# Patient Record
Sex: FEMALE | Race: WHITE | NOT HISPANIC OR LATINO | Employment: UNEMPLOYED | URBAN - METROPOLITAN AREA
[De-identification: names, ages, dates, MRNs, and addresses within clinical notes are randomized per-mention and may not be internally consistent; named-entity substitution may affect disease eponyms.]

---

## 2018-11-07 ENCOUNTER — OFFICE VISIT CONVERTED (OUTPATIENT)
Dept: INTERNAL MEDICINE | Facility: CLINIC | Age: 38
End: 2018-11-07
Attending: NURSE PRACTITIONER

## 2019-01-07 ENCOUNTER — HOSPITAL ENCOUNTER (OUTPATIENT)
Dept: SURGERY | Facility: CLINIC | Age: 39
Discharge: HOME | End: 2019-01-07
Attending: UROLOGY

## 2019-01-07 ENCOUNTER — OFFICE VISIT CONVERTED (OUTPATIENT)
Dept: UROLOGY | Facility: CLINIC | Age: 39
End: 2019-01-07
Attending: UROLOGY

## 2019-01-10 LAB
AMOXICILLIN+CLAV SUSC ISLT: 16
AMPICILLIN SUSC ISLT: >=32
AMPICILLIN+SULBAC SUSC ISLT: >=32
BACTERIA UR CULT: ABNORMAL
CEFAZOLIN SUSC ISLT: <=4
CEFEPIME SUSC ISLT: <=1
CEFTAZIDIME SUSC ISLT: <=1
CEFTRIAXONE SUSC ISLT: <=1
CEFUROXIME ORAL SUSC ISLT: 4
CEFUROXIME PARENTER SUSC ISLT: 4
CIPROFLOXACIN SUSC ISLT: <=0.25
ERTAPENEM SUSC ISLT: <=0.5
GENTAMICIN SUSC ISLT: <=1
LEVOFLOXACIN SUSC ISLT: 0.5
NITROFURANTOIN SUSC ISLT: <=16
TETRACYCLINE SUSC ISLT: <=1
TMP SMX SUSC ISLT: <=20
TOBRAMYCIN SUSC ISLT: <=1

## 2019-01-25 ENCOUNTER — HOSPITAL ENCOUNTER (OUTPATIENT)
Dept: GENERAL RADIOLOGY | Facility: HOSPITAL | Age: 39
Discharge: HOME | End: 2019-01-25
Attending: UROLOGY

## 2019-01-28 ENCOUNTER — OFFICE VISIT CONVERTED (OUTPATIENT)
Dept: UROLOGY | Facility: CLINIC | Age: 39
End: 2019-01-28
Attending: UROLOGY

## 2019-01-28 ENCOUNTER — HOSPITAL ENCOUNTER (OUTPATIENT)
Dept: GENERAL RADIOLOGY | Facility: HOSPITAL | Age: 39
Discharge: HOME | End: 2019-01-28
Attending: UROLOGY

## 2019-02-06 ENCOUNTER — HOSPITAL ENCOUNTER (OUTPATIENT)
Dept: OTHER | Facility: HOSPITAL | Age: 39
Discharge: HOME | End: 2019-02-06
Attending: NURSE PRACTITIONER

## 2019-02-06 LAB
ALBUMIN SERPL-MCNC: 4.2 G/DL (ref 3.5–5)
ALBUMIN/GLOB SERPL: 1.3 {RATIO} (ref 1.4–2.6)
ALP SERPL-CCNC: 61 U/L (ref 42–98)
ALT SERPL-CCNC: 16 U/L (ref 10–40)
ANION GAP SERPL CALC-SCNC: 13 MMOL/L (ref 8–19)
AST SERPL-CCNC: 16 U/L (ref 15–50)
BILIRUB SERPL-MCNC: 0.86 MG/DL (ref 0.2–1.3)
BUN SERPL-MCNC: 8 MG/DL (ref 5–25)
BUN/CREAT SERPL: 10 {RATIO} (ref 6–20)
CALCIUM SERPL-MCNC: 9.1 MG/DL (ref 8.7–10.4)
CHLORIDE SERPL-SCNC: 103 MMOL/L (ref 99–111)
CONV CO2: 26 MMOL/L (ref 22–32)
CONV TOTAL PROTEIN: 7.4 G/DL (ref 6.3–8.2)
CREAT UR-MCNC: 0.81 MG/DL (ref 0.5–0.9)
GFR SERPLBLD BASED ON 1.73 SQ M-ARVRAT: >60 ML/MIN/{1.73_M2}
GLOBULIN UR ELPH-MCNC: 3.2 G/DL (ref 2–3.5)
GLUCOSE SERPL-MCNC: 90 MG/DL (ref 65–99)
OSMOLALITY SERPL CALC.SUM OF ELEC: 284 MOSM/KG (ref 273–304)
POTASSIUM SERPL-SCNC: 4.3 MMOL/L (ref 3.5–5.3)
SODIUM SERPL-SCNC: 138 MMOL/L (ref 135–147)

## 2019-02-08 ENCOUNTER — OFFICE VISIT CONVERTED (OUTPATIENT)
Dept: INTERNAL MEDICINE | Facility: CLINIC | Age: 39
End: 2019-02-08
Attending: NURSE PRACTITIONER

## 2019-04-01 ENCOUNTER — HOSPITAL ENCOUNTER (OUTPATIENT)
Dept: OTHER | Facility: HOSPITAL | Age: 39
Discharge: HOME | End: 2019-04-01
Attending: NURSE PRACTITIONER

## 2019-04-01 ENCOUNTER — OFFICE VISIT CONVERTED (OUTPATIENT)
Dept: INTERNAL MEDICINE | Facility: CLINIC | Age: 39
End: 2019-04-01
Attending: NURSE PRACTITIONER

## 2019-04-05 LAB
CONV LAST MENSTURAL PERIOD: NORMAL
PATHOLOGIST REVIEW: NORMAL
SPECIMEN SOURCE: NORMAL
SPECIMEN SOURCE: NORMAL
THIN PREP CVX: NORMAL

## 2019-05-31 ENCOUNTER — HOSPITAL ENCOUNTER (OUTPATIENT)
Dept: OTHER | Facility: HOSPITAL | Age: 39
Discharge: HOME | End: 2019-05-31
Attending: NURSE PRACTITIONER

## 2019-05-31 LAB
ALBUMIN SERPL-MCNC: 4 G/DL (ref 3.5–5)
ALBUMIN/GLOB SERPL: 1.3 {RATIO} (ref 1.4–2.6)
ALP SERPL-CCNC: 62 U/L (ref 42–98)
ALT SERPL-CCNC: 13 U/L (ref 10–40)
ANION GAP SERPL CALC-SCNC: 17 MMOL/L (ref 8–19)
AST SERPL-CCNC: 14 U/L (ref 15–50)
BILIRUB SERPL-MCNC: 0.53 MG/DL (ref 0.2–1.3)
BUN SERPL-MCNC: 9 MG/DL (ref 5–25)
BUN/CREAT SERPL: 11 {RATIO} (ref 6–20)
CALCIUM SERPL-MCNC: 9 MG/DL (ref 8.7–10.4)
CHLORIDE SERPL-SCNC: 105 MMOL/L (ref 99–111)
CONV CO2: 22 MMOL/L (ref 22–32)
CONV TOTAL PROTEIN: 7.1 G/DL (ref 6.3–8.2)
CREAT UR-MCNC: 0.8 MG/DL (ref 0.5–0.9)
GFR SERPLBLD BASED ON 1.73 SQ M-ARVRAT: >60 ML/MIN/{1.73_M2}
GLOBULIN UR ELPH-MCNC: 3.1 G/DL (ref 2–3.5)
GLUCOSE SERPL-MCNC: 100 MG/DL (ref 65–99)
OSMOLALITY SERPL CALC.SUM OF ELEC: 287 MOSM/KG (ref 273–304)
POTASSIUM SERPL-SCNC: 4.5 MMOL/L (ref 3.5–5.3)
SODIUM SERPL-SCNC: 139 MMOL/L (ref 135–147)

## 2020-03-31 ENCOUNTER — OFFICE VISIT CONVERTED (OUTPATIENT)
Dept: INTERNAL MEDICINE | Facility: CLINIC | Age: 40
End: 2020-03-31
Attending: INTERNAL MEDICINE

## 2020-03-31 ENCOUNTER — HOSPITAL ENCOUNTER (OUTPATIENT)
Dept: OTHER | Facility: HOSPITAL | Age: 40
Discharge: HOME | End: 2020-03-31
Attending: INTERNAL MEDICINE

## 2020-03-31 LAB
ALBUMIN SERPL-MCNC: 4.2 G/DL (ref 3.5–5)
ALBUMIN/GLOB SERPL: 1.4 {RATIO} (ref 1.4–2.6)
ALP SERPL-CCNC: 66 U/L (ref 42–98)
ALT SERPL-CCNC: 14 U/L (ref 10–40)
ANION GAP SERPL CALC-SCNC: 15 MMOL/L (ref 8–19)
AST SERPL-CCNC: 16 U/L (ref 15–50)
BASOPHILS # BLD AUTO: 0.05 10*3/UL (ref 0–0.2)
BASOPHILS NFR BLD AUTO: 0.8 % (ref 0–3)
BILIRUB SERPL-MCNC: 1.06 MG/DL (ref 0.2–1.3)
BUN SERPL-MCNC: 10 MG/DL (ref 5–25)
BUN/CREAT SERPL: 13 {RATIO} (ref 6–20)
CALCIUM SERPL-MCNC: 9 MG/DL (ref 8.7–10.4)
CHLORIDE SERPL-SCNC: 103 MMOL/L (ref 99–111)
CONV ABS IMM GRAN: 0.01 10*3/UL (ref 0–0.2)
CONV CO2: 24 MMOL/L (ref 22–32)
CONV IMMATURE GRAN: 0.2 % (ref 0–1.8)
CONV TOTAL PROTEIN: 7.2 G/DL (ref 6.3–8.2)
CREAT UR-MCNC: 0.8 MG/DL (ref 0.5–0.9)
DEPRECATED RDW RBC AUTO: 43.8 FL (ref 36.4–46.3)
EOSINOPHIL # BLD AUTO: 0.1 10*3/UL (ref 0–0.7)
EOSINOPHIL # BLD AUTO: 1.7 % (ref 0–7)
ERYTHROCYTE [DISTWIDTH] IN BLOOD BY AUTOMATED COUNT: 12.7 % (ref 11.7–14.4)
GFR SERPLBLD BASED ON 1.73 SQ M-ARVRAT: >60 ML/MIN/{1.73_M2}
GLOBULIN UR ELPH-MCNC: 3 G/DL (ref 2–3.5)
GLUCOSE SERPL-MCNC: 96 MG/DL (ref 65–99)
HCG SERPL-SCNC: NEGATIVE MMOL/L
HCT VFR BLD AUTO: 44 % (ref 37–47)
HGB BLD-MCNC: 14.3 G/DL (ref 12–16)
LYMPHOCYTES # BLD AUTO: 2.56 10*3/UL (ref 1–5)
LYMPHOCYTES NFR BLD AUTO: 42.7 % (ref 20–45)
MCH RBC QN AUTO: 30.4 PG (ref 27–31)
MCHC RBC AUTO-ENTMCNC: 32.5 G/DL (ref 33–37)
MCV RBC AUTO: 93.6 FL (ref 81–99)
MONOCYTES # BLD AUTO: 0.44 10*3/UL (ref 0.2–1.2)
MONOCYTES NFR BLD AUTO: 7.3 % (ref 3–10)
NEUTROPHILS # BLD AUTO: 2.83 10*3/UL (ref 2–8)
NEUTROPHILS NFR BLD AUTO: 47.3 % (ref 30–85)
NRBC CBCN: 0 % (ref 0–0.7)
OSMOLALITY SERPL CALC.SUM OF ELEC: 285 MOSM/KG (ref 273–304)
PLATELET # BLD AUTO: 181 10*3/UL (ref 130–400)
PMV BLD AUTO: 12.2 FL (ref 9.4–12.3)
POTASSIUM SERPL-SCNC: 4.4 MMOL/L (ref 3.5–5.3)
RBC # BLD AUTO: 4.7 10*6/UL (ref 4.2–5.4)
SODIUM SERPL-SCNC: 138 MMOL/L (ref 135–147)
WBC # BLD AUTO: 5.99 10*3/UL (ref 4.8–10.8)

## 2020-07-08 ENCOUNTER — OFFICE VISIT CONVERTED (OUTPATIENT)
Dept: INTERNAL MEDICINE | Facility: CLINIC | Age: 40
End: 2020-07-08
Attending: NURSE PRACTITIONER

## 2020-10-31 ENCOUNTER — HOSPITAL ENCOUNTER (OUTPATIENT)
Dept: OTHER | Facility: HOSPITAL | Age: 40
Discharge: HOME | End: 2020-10-31
Attending: OBSTETRICS & GYNECOLOGY

## 2020-10-31 LAB
ABO GROUP BLD: NORMAL
BLD GP AB SCN SERPL QL: NORMAL
BLOOD GROUP ANTIBODIES SERPL: NORMAL
CONV ABD CONTROL: NORMAL
RH BLD: NORMAL

## 2020-12-11 ENCOUNTER — HOSPITAL ENCOUNTER (OUTPATIENT)
Dept: LABOR AND DELIVERY | Facility: HOSPITAL | Age: 40
Discharge: HOME | End: 2020-12-11
Attending: OBSTETRICS & GYNECOLOGY

## 2020-12-18 ENCOUNTER — HOSPITAL ENCOUNTER (OUTPATIENT)
Dept: LABOR AND DELIVERY | Facility: HOSPITAL | Age: 40
Discharge: HOME | End: 2020-12-18
Attending: OBSTETRICS & GYNECOLOGY

## 2020-12-22 ENCOUNTER — HOSPITAL ENCOUNTER (OUTPATIENT)
Dept: LABOR AND DELIVERY | Facility: HOSPITAL | Age: 40
Discharge: HOME | End: 2020-12-22
Attending: OBSTETRICS & GYNECOLOGY

## 2020-12-24 ENCOUNTER — HOSPITAL ENCOUNTER (OUTPATIENT)
Dept: LABOR AND DELIVERY | Facility: HOSPITAL | Age: 40
Discharge: HOME | End: 2020-12-24
Attending: OBSTETRICS & GYNECOLOGY

## 2020-12-25 ENCOUNTER — HOSPITAL ENCOUNTER (OUTPATIENT)
Dept: LABOR AND DELIVERY | Facility: HOSPITAL | Age: 40
Discharge: HOME | End: 2020-12-25
Attending: OBSTETRICS & GYNECOLOGY

## 2020-12-25 LAB — CONV ALPHA-1-MICROGLOBULIN PLACENTAL (VAGINAL FLUID): NEGATIVE

## 2020-12-29 ENCOUNTER — HOSPITAL ENCOUNTER (OUTPATIENT)
Dept: LABOR AND DELIVERY | Facility: HOSPITAL | Age: 40
Discharge: HOME | End: 2020-12-29
Attending: OBSTETRICS & GYNECOLOGY

## 2021-01-04 ENCOUNTER — HOSPITAL ENCOUNTER (OUTPATIENT)
Dept: LABOR AND DELIVERY | Facility: HOSPITAL | Age: 41
Discharge: HOME | End: 2021-01-04
Attending: OBSTETRICS & GYNECOLOGY

## 2021-01-05 ENCOUNTER — HOSPITAL ENCOUNTER (OUTPATIENT)
Dept: LABOR AND DELIVERY | Facility: HOSPITAL | Age: 41
Discharge: HOME | End: 2021-01-05
Attending: OBSTETRICS & GYNECOLOGY

## 2021-01-08 ENCOUNTER — HOSPITAL ENCOUNTER (OUTPATIENT)
Dept: PREADMISSION TESTING | Facility: HOSPITAL | Age: 41
Discharge: HOME | End: 2021-01-08
Attending: OBSTETRICS & GYNECOLOGY

## 2021-01-09 LAB — SARS-COV-2 RNA SPEC QL NAA+PROBE: NOT DETECTED

## 2021-01-12 ENCOUNTER — HOSPITAL ENCOUNTER (OUTPATIENT)
Dept: LABOR AND DELIVERY | Facility: HOSPITAL | Age: 41
Discharge: HOME | End: 2021-01-12
Attending: OBSTETRICS & GYNECOLOGY

## 2021-01-22 ENCOUNTER — CONVERSION ENCOUNTER (OUTPATIENT)
Dept: INTERNAL MEDICINE | Facility: CLINIC | Age: 41
End: 2021-01-22

## 2021-01-22 ENCOUNTER — OFFICE VISIT CONVERTED (OUTPATIENT)
Dept: INTERNAL MEDICINE | Facility: CLINIC | Age: 41
End: 2021-01-22
Attending: PHYSICIAN ASSISTANT

## 2021-05-12 NOTE — PROGRESS NOTES
"   Progress Note      Patient Name: Rosemary Perez   Patient ID: [id number]   Sex: Female   YOB: 1980    Primary Care Provider: Maine JACINTO   Referring Provider: Maine JACINTO    Visit Date: March 31, 2020    Provider: Soumya Jarrett MD   Location: [facility]   Location Address: [address] [place] [address]  [id number]   Location Phone: [phone]          Chief Complaint  · \"I need to have a follow-up CBC from my ER visit on Sunday\"      History Of Present Illness  Rosemary Perez is a 39 year old /White female who presents for evaluation and treatment of:      acute pain    right sided lower abd pain over the weekend  went to ER  was found to have a ruptured ovarian cyst  no chest pain  no trouble breathing  pain is now resolved  she feels that this has happened before  CT showed renal issues as well, however she has had this since childhood  she hasn't seen urology recently, but would like to follow up with them    currently no fever  no abd pain  eating well  feels totally normal       Past Medical History  Disease Name Date Onset Notes   Anxiety --  --    Broken Bones --  1996   Depression --  2012   Diabetes in pregnancy --  --    Migraine headache --  --    Night sweats --  --    Recurrent UTI (urinary tract infection) --  --    Umbilical hernia --  --          Past Surgical History  Procedure Name Date Notes   Bilateral ureterectomy with reimplantation of ureters 1991 in [place]         Medication List  Name Date Started Instructions   cephalexin 500 mg oral capsule  take 1 capsule (500 mg) by oral route every 6 hours   ibuprofen 600 mg oral tablet  take 1 tablet (600 mg) by oral route every 6 hours as needed with food   Norco  mg oral tablet  take 1 tablet by oral route every 6 hours as needed for pain         Allergy List  Allergen Name Date Reaction Notes   NO KNOWN DRUG ALLERGIES --  --  --    Tape --  adhesive --  " "  Tegaderm --  --  --        Allergies Reconciled  Family Medical History  Disease Name Relative/Age Notes   Breast Neoplasm, Malignant Grandmother (maternal)/   --    Stroke  --    Heart Disease  --    Diabetes Mother/   --    FH: pancreatic cancer  --          Social History  Finding Status Start/Stop Quantity Notes   Alcohol --  --/-- --  occasionally   Tobacco Former --/-- 1 ppd 7 yrs          Immunizations  NameDate Admin Mfg Trade Name Lot Number Route Inj VIS Given VIS Publication   [id number]/16/2019 [facility] Fluzone Quadrivalent [id number] IM RD 12/16/2019    Comments: Pt tolerated well and left office in stable condition         Review of Systems  · Constitutional  o Denies  o : fever, fatigue, weight loss, weight gain  · Cardiovascular  o Denies  o : lower extremity edema, claudication, chest pressure, palpitations  · Respiratory  o Denies  o : shortness of breath, wheezing, cough, hemoptysis, dyspnea on exertion  · Gastrointestinal  o Denies  o : nausea, vomiting, diarrhea, constipation, abdominal pain      Vitals  Date Time BP Position Site L\R Cuff Size HR RR TEMP (F) WT  HT  BMI kg/m2 BSA m2 O2 Sat        02/08/2019 10:50 /76 Sitting    98 - R 16 98 152lbs 8oz 5'  8\" 23.19 1.82 97 %    04/01/2019 04:48 /78 Sitting    110 - R 16 98.1 158lbs 2oz 5'  8\" 24.04 1.86 98 %    03/31/2020 08:07 /74 Sitting    84 - R 16 98.5 163lbs 6oz 5'  8\" 24.84 1.89 98 %          Physical Examination  · Constitutional  o Appearance  o : no acute distress, well-nourished  · Head and Face  o Head  o :   § Inspection  § : atraumatic, normocephalic  · Eyes  o Eyes  o : extraocular movements intact, no scleral icterus, no conjunctival injection  · Respiratory  o Respiratory Effort  o : breathing comfortably, symmetric chest rise  o Auscultation of Lungs  o : clear to asculatation bilaterally, no wheezes, rales, or rhonchii  · Cardiovascular  o Heart  o :   § Auscultation of Heart  § : regular rate and rhythm, no " murmurs, rubs, or gallops  o Peripheral Vascular System  o :   § Extremities  § : no edema  · Gastrointestinal  o Abdominal Examination  o :   § Abdomen  § : bowel sounds present, non-distended, non-tender  · Neurologic  o Mental Status Examination  o :   § Orientation  § : grossly oriented to person, place and time  o Gait and Station  o :   § Gait Screening  § : normal gait  · Psychiatric  o General  o : normal mood and affect              Assessment  · Recurrent UTI     599.0/N39.0  will refer to urology as CT showed some scarring  · Right lower quadrant pain     789.03/R10.31  resolved    will check labs as this was suggested by the ER and radiology suggested checking BHCG  · Ovarian cyst     620.2/N83.209  encouraged her to follow up with OB/Gyn      Plan  · Orders  o CBC with Auto Diff [facility] ([id number]) - 599.0/N39.0 - 03/31/2020  o CMP [facility] ([id number]) - 599.0/N39.0, 789.03/R10.31, 620.2/N83.209 - 03/31/2020  o ACO-39: Current medications updated and reviewed () - - 03/31/2020  o B-HCG (Qualitative) ([id number]) - 599.0/N39.0, 789.03/R10.31, 620.2/N83.209 - 03/31/2020  · Medications  o cephalexin 500 mg oral capsule   SIG: take 1 capsule (500 mg) by oral route every 6 hours   DISP: (0) capsule with 0 refills  Discontinued on 03/31/2020     o Medications have been Reconciled  o Transition of Care or Provider Policy  · Instructions  o Patient was educated/instructed on their diagnosis, treatment and medications prior to discharge from the clinic today.  · Disposition  o As Needed Follow up            Electronically Signed by: Soumya Jarrett MD -Author on March 31, 2020 08:38:23 AM

## 2021-05-13 NOTE — PROGRESS NOTES
Progress Note      Patient Name: Rosemary Perez   Patient ID: [id number]   Sex: Female   YOB: 1980    Primary Care Provider: Maine JACINOT   Referring Provider: Maine JACINTO    Visit Date: July 8, 2020    Provider: OPHELIA Clarke   Location: [facility]   Location Address: [address] [place] [address], [address]  [place], [place]  [id number]   Location Phone: [phone]          Chief Complaint  · varicose veins, hurting      History Of Present Illness  Rosemary Perez is a 39 year old /White female who presents for evaluation and treatment of:      Varicose veins-  Started during pregnancy of middle child, have progressively worsened. Patient is currently 13 weeks pregnant. She has not been wearing compression stockings or elevating her feet. Reports history of superficial blood clot of the right thigh following the birth of her three year old. States since that time veins of bilateral thighs have progressively worsened, will get more painful and engorged the longer that she is on her feet. Denies erythema, edema, warmth.     Depression-  Patient reports she is doing well managing her depression without medication, however she does struggle with depressive episodes mostly centered around her teenage sons who are disrespectful. Patient reports she often finds herself in the shower crying, however knows she is more emotional now due to pregnancy. Denies SI/HI.       Past Medical History  Disease Name Date Onset Notes   Anxiety --  --    Broken Bones --  1996   Depression --  2012   Diabetes in pregnancy --  --    Migraine headache --  --    Night sweats --  --    Recurrent UTI (urinary tract infection) --  --    Umbilical hernia --  --          Past Surgical History  Procedure Name Date Notes   Bilateral ureterectomy with reimplantation of ureters 1991 in [place]         Allergy List  Allergen Name Date Reaction Notes   NO KNOWN DRUG ALLERGIES --  --  --    Tape  "--  adhesive --    Tegaderm --  --  --          Family Medical History  Disease Name Relative/Age Notes   Breast Neoplasm, Malignant Grandmother (maternal)/   --    Stroke  --    Heart Disease  --    Diabetes Mother/   --    FH: pancreatic cancer  --          Social History  Finding Status Start/Stop Quantity Notes   Alcohol --  --/-- --  occasionally   Tobacco Former --/-- 1 ppd 7 yrs          Immunizations  NameDate Admin Mfg Trade Name Lot Number Route Inj VIS Given VIS Publication   [id number]/16/2019 [facility] Fluzone Quadrivalent [id number] IM RD 12/16/2019    Comments: Pt tolerated well and left office in stable condition         Review of Systems  · Constitutional  o Denies  o : fever, fatigue, weight loss, weight gain  · Cardiovascular  o Admits  o : varicosities  o Denies  o : chest pain, palpitations, lower extremity edema  · Respiratory  o Denies  o : shortness of breath, wheezing, frequent cough, dyspnea on exertion  · Gastrointestinal  o Denies  o : nausea, vomiting, diarrhea, constipation, abdominal pain  · Neurologic  o Denies  o : altered mental status, muscular weakness, tingling or numbness  · Psychiatric  o Admits  o : depression  o Denies  o : anxiety, suicidal ideation, homicidal ideation      Vitals  Date Time BP Position Site L\R Cuff Size HR RR TEMP (F) WT  HT  BMI kg/m2 BSA m2 O2 Sat        04/01/2019 04:48 /78 Sitting    110 - R 16 98.1 158lbs 2oz 5'  8\" 24.04 1.86 98 %    03/31/2020 08:07 /74 Sitting    84 - R 16 98.5 163lbs 6oz 5'  8\" 24.84 1.89 98 %    07/08/2020 03:54 /68 Sitting    97 - R 16 98.7 164lbs 4oz 5'  8\" 24.97 1.89 98 %          Physical Examination  · Constitutional  o Appearance  o : no acute distress, well-nourished  · Head and Face  o Head  o :   § Inspection  § : atraumatic, normocephalic  · Eyes  o Eyes  o : extraocular movements intact, no scleral icterus, no conjunctival injection  · Ears, Nose, Mouth and Throat  o Ears  o :   § External Ears  § : " normal  o Nose  o :   § Intranasal Exam  § : nares patent  o Oral Cavity  o :   § Oral Mucosa  § : moist mucous membranes  · Respiratory  o Respiratory Effort  o : breathing comfortably, symmetric chest rise  o Auscultation of Lungs  o : clear to asculatation bilaterally, no wheezes, rales, or rhonchii  · Cardiovascular  o Heart  o :   § Auscultation of Heart  § : regular rate and rhythm, no murmurs, rubs, or gallops  o Peripheral Vascular System  o :   § Extremities  § : no edema  · Skin and Subcutaneous Tissue  o General Inspection  o : scattered varicose veins thighs/calves bilateral lower extremities; without warmth, erythema, edema, streaking, tenderness  · Neurologic  o Mental Status Examination  o :   § Orientation  § : grossly oriented to person, place and time  o Gait and Station  o :   § Gait Screening  § : normal gait  · Psychiatric  o General  o : normal mood and affect              Assessment  · Screening for depression     V79.0/Z13.89  PHQ9 score of 10.  · Depression     311/F32.9  Well controlled without medication per patient. She will continue to moitnor and seek medical attention immediately if she feels that her mental health is deteriorating. Denies SI/HI. Will continue to monitor.  · Varicose veins of both lower extremities     454.9/I83.93  Discussed conservative care, including thigh high compression stockings, elevation of the feet, stretches, ambulation. Patient would prefer to trial conservative measures prior to scheduling Doppler. Will call or return to clinic if symptoms worsen or persist, could consider bilateral lower extremity Doppler and vascular referral if warranted. Patient to seek medical attention immediately with calf pain, warmth, erythema, edema. Patient voices understanding and is agreeable to plan.     Problems Reconciled  Plan  · Orders  o ACO-39: Current medications updated and reviewed () - - 07/08/2020  o ACO-18: Positive screen for clinical depression using a  standardized tool and a follow-up plan documented () - V79.0/Z13.89 - 07/08/2020  · Medications  o Medications have been Reconciled  o Transition of Care or Provider Policy  · Instructions  o Depression Screen completed and scanned into the EMR under the designated folder within the patient's documents.  o Today's PHQ-9 result is 10  o Patient was educated/instructed on their diagnosis, treatment and medications prior to discharge from the clinic today.  o Patient instructed to seek medical attention urgently for new or worsening symptoms.  o Call the office with any concerns or questions.  · Disposition  o Call or Return if symptoms worsen or persist.  o Follow up as needed            Electronically Signed by: OPHELIA Clarke -Author on July 10, 2020 07:33:03 AM

## 2021-05-14 VITALS
DIASTOLIC BLOOD PRESSURE: 80 MMHG | TEMPERATURE: 98.2 F | BODY MASS INDEX: 25.91 KG/M2 | OXYGEN SATURATION: 100 % | HEART RATE: 84 BPM | RESPIRATION RATE: 15 BRPM | SYSTOLIC BLOOD PRESSURE: 122 MMHG | WEIGHT: 171 LBS | HEIGHT: 68 IN

## 2021-05-14 NOTE — PROGRESS NOTES
Progress Note      Patient Name: Rosemary Perez   Patient ID: [id number]   Sex: Female   YOB: 1980    Primary Care Provider: Maine JACINTO   Referring Provider: Maine JACINTO    Visit Date: January 22, 2021    Provider: Adwoa Hernandez PA-C   Location: [facility]   Location Address: [address] [place] [address]  [place], [place]  [id number]   Location Phone: [phone]          Chief Complaint  · R hand pain       History Of Present Illness  Rosemary Perez is a 40 year old /White female who presents for evaluation and treatment of:      R hand pain for over a month. First started after injuring her hand bowling.  Pt is in a bowling league. She has bowled since she was 7 yrs old  When she reaches to grab something that is when she feels it the most, she has pain that radiates up around elbow and into her back behind her shoulder blade.   Pain is constant in fingers but worse if she is reaching.   She has numbness and tingling in all fingers except the pinky.   She states when she makes a fist she feels pain along her knucles.   She feels fingers are swollen.   Denies discoloration or blueing of fingers.  She had a baby 1 wk ago. She had swelling with pregnancy.   Denies neck pain. She is able to move shoulder, elbow, and wrist, fingers normally.   Denies fever.   No issues with L hand.  She never had issues with pain before last month.       Past Medical History  Disease Name Date Onset Notes   Anxiety --  --    Broken Bones --  1996   Depression --  2012   Diabetes in pregnancy --  --    Migraine headache --  --    Night sweats --  --    Recurrent UTI (urinary tract infection) --  --    Umbilical hernia --  --          Past Surgical History  Procedure Name Date Notes   Bilateral ureterectomy with reimplantation of ureters 1991 in [place]         Allergy List  Allergen Name Date Reaction Notes   NO KNOWN DRUG ALLERGIES --  --  --    Tape --  adhesive --   "  Tegaderm --  --  --        Allergies Reconciled  Family Medical History  Disease Name Relative/Age Notes   Breast Neoplasm, Malignant Grandmother (maternal)/   --    Stroke  --    Heart Disease  --    Diabetes Mother/   --    FH: pancreatic cancer  --          Social History  Finding Status Start/Stop Quantity Notes   Alcohol --  --/-- --  occasionally   Tobacco Former --/-- 1 ppd 7 yrs          Immunizations  NameDate Admin Mfg Trade Name Lot Number Route Inj VIS Given VIS Publication   [id number]/27/2020 [facility] Fluzone Quadrivalent [id number] IM LA 10/27/2020 08/15/2019   Comments: Tolerated well, no adverse reactions noted, pt left office stable.   Tdap10/27/2020 [facility] BOOSTRIX [id number] IM RA 10/27/2020    Comments: tolerated well, no advers reactions noted, pt left office stable         Vitals  Date Time BP Position Site L\R Cuff Size HR RR TEMP (F) WT  HT  BMI kg/m2 BSA m2 O2 Sat FR L/min FiO2 HC       03/31/2020 08:07 /74 Sitting    84 - R 16 98.5 163lbs 6oz 5'  8\" 24.84 1.89 98 %  21%    07/08/2020 03:54 /68 Sitting    97 - R 16 98.7 164lbs 4oz 5'  8\" 24.97 1.89 98 %  21%    01/22/2021 03:01 /80 Sitting    84 - R 15 98.2 171lbs 0oz 5'  8\" 26 1.93 100 %            Physical Examination  · Constitutional  o Appearance  o : no acute distress, well-nourished  · Head and Face  o Head  o :   § Inspection  § : atraumatic, normocephalic  · Eyes  o Eyes  o : extraocular movements intact, no scleral icterus, no conjunctival injection  · Ears, Nose, Mouth and Throat  o Ears  o :   § External Ears  § : normal  o Nose  o :   § Intranasal Exam  § : nares patent  o Oral Cavity  o :   § Oral Mucosa  § : moist mucous membranes  · Respiratory  o Respiratory Effort  o : breathing comfortably, symmetric chest rise  o Auscultation of Lungs  o : clear to asculatation bilaterally, no wheezes, rales, or rhonchii  · Cardiovascular  o Heart  o :   § Auscultation of Heart  § : regular rate and rhythm, no murmurs, rubs, or " gallops  o Peripheral Vascular System  o :   § Extremities  § : no edema  · Skin and Subcutaneous Tissue  o General Inspection  o : no lesions present, no areas of discoloration, skin turgor normal  · Neurologic  o Mental Status Examination  o :   § Orientation  § : grossly oriented to person, place and time  o Gait and Station  o :   § Gait Screening  § : normal gait  · Psychiatric  o General  o : normal mood and affect     MSK: NROM R shoulder, elbow wrist, fingers  minimal edema noted in 2 and 3 fingers on R hand.  NROM neck.               Assessment  · Right hand pain     729.5/M79.641  discussed differential diagnosis of right hand pain. Discussed possibly related to swelling from pregnancy. Will monitor for a few weeks since patient is only 1 week postpartum to see if it improves. Encouraged ibuprofen over-the-counter to help with inflammation at this point. If no improvement will refer to Ortho to discuss imaging in case she had an injury bowling when symptoms were started.  · Neuropathy     355.9/G62.9      Plan  · Orders  o ACO-39: Current medications updated and reviewed (, 1159F) - - 01/22/2021  · Medications  o ibuprofen 200 mg oral capsule   SIG: take 2 capsules (400 mg) by oral route every 6 hours as needed with food for 15 days   DISP: (90) Capsule with 0 refills  Prescribed on 01/22/2021     o Medications have been Reconciled  o Transition of Care or Provider Policy  · Instructions  o Patient was educated/instructed on their diagnosis, treatment and medications prior to discharge from the clinic today.  · Disposition  o Call or Return if symptoms worsen or persist.  o Follow up in 2 weeks            Electronically Signed by: Adwoa Hernandez PA-C -Author on January 22, 2021 08:49:45 PM

## 2021-05-15 VITALS
SYSTOLIC BLOOD PRESSURE: 122 MMHG | HEIGHT: 68 IN | WEIGHT: 149.25 LBS | BODY MASS INDEX: 22.62 KG/M2 | DIASTOLIC BLOOD PRESSURE: 78 MMHG

## 2021-05-15 VITALS
HEIGHT: 68 IN | OXYGEN SATURATION: 98 % | DIASTOLIC BLOOD PRESSURE: 74 MMHG | RESPIRATION RATE: 16 BRPM | SYSTOLIC BLOOD PRESSURE: 116 MMHG | TEMPERATURE: 98.5 F | BODY MASS INDEX: 24.76 KG/M2 | WEIGHT: 163.37 LBS | HEART RATE: 84 BPM

## 2021-05-15 VITALS
WEIGHT: 158.12 LBS | HEART RATE: 110 BPM | RESPIRATION RATE: 16 BRPM | OXYGEN SATURATION: 98 % | HEIGHT: 68 IN | BODY MASS INDEX: 23.96 KG/M2 | DIASTOLIC BLOOD PRESSURE: 78 MMHG | TEMPERATURE: 98.1 F | SYSTOLIC BLOOD PRESSURE: 118 MMHG

## 2021-05-15 VITALS
WEIGHT: 152.5 LBS | HEART RATE: 98 BPM | TEMPERATURE: 98 F | RESPIRATION RATE: 16 BRPM | DIASTOLIC BLOOD PRESSURE: 76 MMHG | SYSTOLIC BLOOD PRESSURE: 110 MMHG | BODY MASS INDEX: 23.11 KG/M2 | HEIGHT: 68 IN | OXYGEN SATURATION: 97 %

## 2021-05-15 VITALS
BODY MASS INDEX: 24.89 KG/M2 | HEART RATE: 97 BPM | SYSTOLIC BLOOD PRESSURE: 110 MMHG | RESPIRATION RATE: 16 BRPM | OXYGEN SATURATION: 98 % | HEIGHT: 68 IN | DIASTOLIC BLOOD PRESSURE: 68 MMHG | TEMPERATURE: 98.7 F | WEIGHT: 164.25 LBS

## 2021-05-15 VITALS
DIASTOLIC BLOOD PRESSURE: 80 MMHG | SYSTOLIC BLOOD PRESSURE: 126 MMHG | HEIGHT: 68 IN | WEIGHT: 154 LBS | BODY MASS INDEX: 23.34 KG/M2

## 2021-05-16 VITALS
HEART RATE: 102 BPM | WEIGHT: 154.25 LBS | TEMPERATURE: 97.2 F | DIASTOLIC BLOOD PRESSURE: 72 MMHG | HEIGHT: 68 IN | BODY MASS INDEX: 23.38 KG/M2 | SYSTOLIC BLOOD PRESSURE: 122 MMHG | OXYGEN SATURATION: 97 %

## 2022-01-06 ENCOUNTER — OFFICE VISIT (OUTPATIENT)
Dept: INTERNAL MEDICINE | Facility: CLINIC | Age: 42
End: 2022-01-06

## 2022-01-06 VITALS
HEART RATE: 92 BPM | WEIGHT: 158.1 LBS | OXYGEN SATURATION: 99 % | TEMPERATURE: 98.2 F | DIASTOLIC BLOOD PRESSURE: 68 MMHG | BODY MASS INDEX: 23.96 KG/M2 | RESPIRATION RATE: 18 BRPM | HEIGHT: 68 IN | SYSTOLIC BLOOD PRESSURE: 121 MMHG

## 2022-01-06 DIAGNOSIS — R05.9 COUGH: ICD-10-CM

## 2022-01-06 DIAGNOSIS — U07.1 COVID-19: Primary | ICD-10-CM

## 2022-01-06 LAB
EXPIRATION DATE: ABNORMAL
EXPIRATION DATE: NORMAL
FLUAV AG NPH QL: NEGATIVE
FLUBV AG NPH QL: NEGATIVE
INTERNAL CONTROL: ABNORMAL
INTERNAL CONTROL: NORMAL
Lab: ABNORMAL
Lab: NORMAL
SARS-COV-2 AG UPPER RESP QL IA.RAPID: DETECTED

## 2022-01-06 PROCEDURE — 87426 SARSCOV CORONAVIRUS AG IA: CPT | Performed by: NURSE PRACTITIONER

## 2022-01-06 PROCEDURE — 99213 OFFICE O/P EST LOW 20 MIN: CPT | Performed by: NURSE PRACTITIONER

## 2022-01-06 PROCEDURE — 87804 INFLUENZA ASSAY W/OPTIC: CPT | Performed by: NURSE PRACTITIONER

## 2022-01-06 NOTE — ASSESSMENT & PLAN NOTE
Patient with positive COVID-19 rapid antigen test.  This does correlate with symptomology and COVID exposure.  She will continue to quarantine for a total of 10 days from onset of symptoms.  She does not wish to have any medications at this time, she has feeling like she is able to manage them on her own at this time.  Did give recommendations.  Advised to follow-up if symptoms worsen or persist.

## 2022-01-06 NOTE — PROGRESS NOTES
"Chief Complaint  Cough (2-3 days), Sore Throat, Fever, Chills, and Generalized Body Aches    Subjective         Rosemary Perez presents to [facility] for Cough, sore throat, fever, chills, and body aches.    Patient reports that on Tuesday she began having symptoms, her  did go to urgent care and tested positive for COVID-19.  Other members in the household also became ill at the same time.  Patient has not had any testing done at this point.  She reports her symptoms are mild and she has been able to manage them thus far.  She is mainly wanted to be tested for confirmation.         Review of Systems   Constitutional: Positive for chills and fatigue. Negative for fever.   HENT: Positive for sore throat. Negative for congestion.    Respiratory: Positive for cough. Negative for shortness of breath.    Gastrointestinal: Negative for diarrhea, nausea and vomiting.   Musculoskeletal: Positive for myalgias.   Skin: Negative for rash.   Neurological: Positive for headaches.       Objective   Vital Signs:   /68 (BP Location: Left arm, Patient Position: Sitting, Cuff Size: Adult)   Pulse 92   Temp 98.2 °F (36.8 °C)   Resp 18   Ht 172.7 cm (68\")   Wt 71.7 kg (158 lb 1.6 oz)   SpO2 99%   BMI 24.04 kg/m²     Physical Exam  Vitals and nursing note reviewed.   Constitutional:       Appearance: Normal appearance. She is normal weight.   HENT:      Head: Normocephalic and atraumatic.      Right Ear: Tympanic membrane normal.      Left Ear: Tympanic membrane normal.      Nose: Nose normal.      Mouth/Throat:      Mouth: Mucous membranes are moist.      Pharynx: Oropharynx is clear.   Eyes:      Conjunctiva/sclera: Conjunctivae normal.      Pupils: Pupils are equal, round, and reactive to light.   Cardiovascular:      Rate and Rhythm: Normal rate.   Pulmonary:      Effort: Pulmonary effort is normal.   Neurological:      Mental Status: She is alert.   Psychiatric:         Mood and " Affect: Mood normal.         Thought Content: Thought content normal.        Result Review :                   Diagnoses and all orders for this visit:    1. COVID-19 (Primary)  Assessment & Plan:  Patient with positive COVID-19 rapid antigen test.  This does correlate with symptomology and COVID exposure.  She will continue to quarantine for a total of 10 days from onset of symptoms.  She does not wish to have any medications at this time, she has feeling like she is able to manage them on her own at this time.  Did give recommendations.  Advised to follow-up if symptoms worsen or persist.      2. Cough  -     POCT Influenza A/B  -     POCT SARS-CoV-2 Antigen ALLISON        Follow Up   Return if symptoms worsen or fail to improve.  Patient was given instructions and counseling regarding her condition or for health maintenance advice. Please see specific information pulled into the AVS if appropriate.     John Vicente, OPHELIA  1/6/2022  This note was electronically signed.

## 2022-01-24 NOTE — TELEPHONE ENCOUNTER
Patient called this am.  She states no longer breast feeding. She requested OTC Lo.   I have attached an order. Last seen wwe 6-25-21.

## 2022-01-25 RX ORDER — NORGESTIMATE AND ETHINYL ESTRADIOL 7DAYSX3 LO
1 KIT ORAL DAILY
Qty: 84 TABLET | Refills: 0 | Status: SHIPPED | OUTPATIENT
Start: 2022-01-25 | End: 2022-07-18

## 2022-06-23 ENCOUNTER — OFFICE VISIT (OUTPATIENT)
Dept: OBSTETRICS AND GYNECOLOGY | Facility: CLINIC | Age: 42
End: 2022-06-23

## 2022-06-23 DIAGNOSIS — Z01.419 ENCOUNTER FOR GYNECOLOGICAL EXAMINATION WITHOUT ABNORMAL FINDING: Primary | ICD-10-CM

## 2022-06-23 PROCEDURE — 99395 PREV VISIT EST AGE 18-39: CPT | Performed by: OBSTETRICS & GYNECOLOGY

## 2022-06-23 PROCEDURE — G0123 SCREEN CERV/VAG THIN LAYER: HCPCS | Performed by: OBSTETRICS & GYNECOLOGY

## 2022-06-23 NOTE — PROGRESS NOTES
Well Woman Visit    CC: Annual well woman exam       HPI:   41 y.o.No obstetric history on file. Contraception or HRT: Contraception:  Vasectomy   Menses:  Have been irreg since stopping breastfeeding and getting covid in January   Pain:  None  Incontinence concerns: No  Hx of abnormal pap:  Yes  Pt has no complaints today.      History: PMHx, Meds, Allergies, PSHx, Social Hx, and POBHx all reviewed and updated.      PHYSICAL EXAM:  There were no vitals taken for this visit.  General- NAD, alert and oriented, appropriate  Psych- Normal mood, good memory  Neck- No masses, no thyroid enlargement  CV- Regular rhythm, no murnurs  Resp- CTA to bases, no wheezes  Abdomen- Soft, non distended, non tender, no masses    Breast left-  Bilaterally symmetrical, no masses, non tender, no nipple discharge  Breast right- Bilaterally symmetrical, no masses, non tender, no nipple discharge    External genitalia- Normal female, no lesions  Urethra/meatus- Normal, no masses, non tender, no prolapse  Bladder- Normal, no masses, non tender, no prolapse  Vagina- Normal, no atrophy, no lesions, no discharge, no prolapse  Cvx- Normal, no lesions, no discharge, No cervical motion tenderness  Uterus- Normal size, shape & consistency.  Non tender, mobile, & no prolapse  Adnexa- No mass, non tender  Anus/Rectum/Perineum- Not performed    Lymphatic- No palpable neck, axillary, or groin nodes  Ext- No edema, no cyanosis    Skin- No lesions, no rashes, no acanthosis nigricans        ASSESSMENT and PLAN:  WWE    Diagnoses and all orders for this visit:    1. Encounter for gynecological examination without abnormal finding (Primary)  -     IGP,rfx Aptima HPV All Pth        Counseling:     Track menses, RTO IF <q21d, >7d long, or heavy    Domestic violence/abuse screen: negative    Depression screen: no SI    Preventative:   BREAST HEALTH- Monthly self breast exam importance and how to reviewed. MMG and/or MRI (prn) reviewed per society guidelines  and her individual history. Mammo/MRI screen: Not medically needed.  CERVICAL CANCER Screening- Reviewed current ASCCP guidelines for screening w and wo cotest HPV, age specific.  Screen: Updated today.  COLON CANCER Screening- Reviewed current medical society guidelines and options.  Colonoscopy screen:  Not medically needed.  SEXUAL HEALTH: Declines STD screening.  VACCINATIONS Recommended: Flu annually.  Importance discussed, risk being unvaccinated reviewed.  Questions answered  Smoking status- NON SMOKER.  Importance of avoiding second hand smoke.  Myriad: has already had testing      She understands the importance of having any ordered tests to be performed in a timely fashion.  She is encouraged to review her results online and/or contact or office if she has questions.     Follow Up:  Return if symptoms worsen or fail to improve.      Lary Marte, APRN  06/23/2022

## 2022-06-27 LAB
CONV .: NORMAL
CYTOLOGIST CVX/VAG CYTO: NORMAL
CYTOLOGY CVX/VAG DOC CYTO: NORMAL
CYTOLOGY CVX/VAG DOC THIN PREP: NORMAL
DX ICD CODE: NORMAL
HIV 1 & 2 AB SER-IMP: NORMAL
OTHER STN SPEC: NORMAL
STAT OF ADQ CVX/VAG CYTO-IMP: NORMAL

## 2022-07-18 ENCOUNTER — OFFICE VISIT (OUTPATIENT)
Dept: INTERNAL MEDICINE | Facility: CLINIC | Age: 42
End: 2022-07-18

## 2022-07-18 VITALS
HEIGHT: 68 IN | DIASTOLIC BLOOD PRESSURE: 62 MMHG | RESPIRATION RATE: 15 BRPM | TEMPERATURE: 99.6 F | SYSTOLIC BLOOD PRESSURE: 128 MMHG | BODY MASS INDEX: 24.1 KG/M2 | WEIGHT: 159 LBS | OXYGEN SATURATION: 96 % | HEART RATE: 103 BPM

## 2022-07-18 DIAGNOSIS — U07.1 COVID-19 VIRUS DETECTED: Primary | ICD-10-CM

## 2022-07-18 DIAGNOSIS — R50.9 FEVER, UNSPECIFIED FEVER CAUSE: ICD-10-CM

## 2022-07-18 PROCEDURE — 99213 OFFICE O/P EST LOW 20 MIN: CPT | Performed by: PHYSICIAN ASSISTANT

## 2022-07-18 PROCEDURE — 87804 INFLUENZA ASSAY W/OPTIC: CPT | Performed by: PHYSICIAN ASSISTANT

## 2022-07-18 PROCEDURE — 87426 SARSCOV CORONAVIRUS AG IA: CPT | Performed by: PHYSICIAN ASSISTANT

## 2022-07-18 NOTE — PROGRESS NOTES
"Chief Complaint  Nasal Congestion and Fever    Subjective          LisaLucia Perez presents to [facility]  Pt here for eval of cough, nasal congestion, and fever.   Sx started 7/17  Fever max 102, improved with tylenol.  Denies sob, chest pain, dizziness  She has been around a child who tested positive for covid.   Pt is not vaccinated but has had covid before.      History reviewed. No pertinent past medical history.     History reviewed. No pertinent surgical history.     Current Outpatient Medications on File Prior to Visit   Medication Sig Dispense Refill   • [DISCONTINUED] norgestimate-ethinyl estradiol (Ortho Tri-Cyclen Lo) 0.18/0.215/0.25 MG-25 MCG per tablet Take 1 tablet by mouth Daily. 84 tablet 0   • [DISCONTINUED] Unable to find 1 each 1 (One) Time. Med Name: Mini Pill Birth Control Pill       No current facility-administered medications on file prior to visit.        Allergies   Allergen Reactions   • Tape Unknown - High Severity   • Tegaderm Chg Dressing [Chlorhexidine] Unknown - High Severity       Social History     Tobacco Use   Smoking Status Never Smoker   Smokeless Tobacco Never Used        Objective   Vital Signs:   /62   Pulse 103   Temp 99.6 °F (37.6 °C)   Resp 15   Ht 172.7 cm (68\")   Wt 72.1 kg (159 lb)   SpO2 96%   BMI 24.18 kg/m²     Physical Exam  Vitals reviewed.   Constitutional:       Appearance: Normal appearance.   HENT:      Head: Normocephalic and atraumatic.      Nose: Nose normal.      Mouth/Throat:      Mouth: Mucous membranes are moist.   Eyes:      Extraocular Movements: Extraocular movements intact.      Conjunctiva/sclera: Conjunctivae normal.      Pupils: Pupils are equal, round, and reactive to light.   Cardiovascular:      Rate and Rhythm: Normal rate and regular rhythm.   Pulmonary:      Effort: Pulmonary effort is normal.      Breath sounds: Normal breath sounds.   Abdominal:      General: Abdomen is " flat. Bowel sounds are normal.      Palpations: Abdomen is soft.   Musculoskeletal:         General: Normal range of motion.   Neurological:      General: No focal deficit present.      Mental Status: She is alert and oriented to person, place, and time.   Psychiatric:         Mood and Affect: Mood normal.        Result Review :                  Lab Results   Component Value Date    SARSANTIGEN Detected (A) 07/18/2022    COVID19 NOT DETECTED 01/08/2021    RAPFLUA Negative 07/18/2022    RAPFLUB Negative 07/18/2022    INR 0.98 (L) 02/08/2020    HGB 14.0 01/13/2021       Assessment and Plan    Diagnoses and all orders for this visit:    1. COVID-19 virus detected (Primary)  Assessment & Plan:  COVID positive. The health department will be in contact with you but we recommend 10 day quarantine from the day of first symptoms since pt is not vaccinated. Isolation can end 24 hr after fever resolved if symptoms are improving. Discussed patient needs to continue precautions up to 10 days after symptom onset- no travel, avoiding others who are likely to become ill from covid, and wearing a mask at all times. You need to let anyone you have been around know so that they can start quarantine or be evaluated if needed. The reasons to seek care again- symptoms worsening, respiratory distress, fever not controlled with Tylenol/Motrin, dehydration causing decreased fluid intake or urine output. There is no treatment for COVID, we can only treat the symptoms so please stay home unless seeking medical care to prevent the spread. Call the office if you have any other concerns or feel you need to be evaluated again.        2. Fever, unspecified fever cause  -     POCT LILY SARS-CoV-2 Antigen ALLISON  -     POC Influenza A / B      Follow Up {Instructions Charge Capture  Follow-up Communications :23}  Return if symptoms worsen or fail to improve.  Patient was given instructions and counseling regarding her condition or for health  maintenance advice. Please see specific information pulled into the AVS if appropriate.

## 2022-07-18 NOTE — ASSESSMENT & PLAN NOTE
COVID positive. The health department will be in contact with you but we recommend 10 day quarantine from the day of first symptoms since pt is not vaccinated. Isolation can end 24 hr after fever resolved if symptoms are improving. Discussed patient needs to continue precautions up to 10 days after symptom onset- no travel, avoiding others who are likely to become ill from covid, and wearing a mask at all times. You need to let anyone you have been around know so that they can start quarantine or be evaluated if needed. The reasons to seek care again- symptoms worsening, respiratory distress, fever not controlled with Tylenol/Motrin, dehydration causing decreased fluid intake or urine output. There is no treatment for COVID, we can only treat the symptoms so please stay home unless seeking medical care to prevent the spread. Call the office if you have any other concerns or feel you need to be evaluated again.

## 2022-12-28 ENCOUNTER — TELEPHONE (OUTPATIENT)
Dept: INTERNAL MEDICINE | Facility: CLINIC | Age: 42
End: 2022-12-28

## 2022-12-28 NOTE — TELEPHONE ENCOUNTER
Caller: Rosemary Perez    Relationship: Self    Best call back number: [phone]    What orders are you requesting (i.e. lab or imaging): LABS    In what timeframe would the patient need to come in: AS SOON AS POSSIBLE    Where will you receive your lab/imaging services: [facility]    Additional notes: PATIENT CALLED AND STATED THAT SHE WOULD LIKE TO HAVE LABS DONE TO SEE IF SHE IS PREMENOPAUSAL.

## 2023-01-04 ENCOUNTER — OFFICE VISIT (OUTPATIENT)
Dept: INTERNAL MEDICINE | Facility: CLINIC | Age: 43
End: 2023-01-04
Payer: MEDICAID

## 2023-01-04 VITALS
TEMPERATURE: 98.2 F | OXYGEN SATURATION: 97 % | SYSTOLIC BLOOD PRESSURE: 128 MMHG | WEIGHT: 168 LBS | DIASTOLIC BLOOD PRESSURE: 76 MMHG | HEART RATE: 98 BPM | BODY MASS INDEX: 25.54 KG/M2

## 2023-01-04 DIAGNOSIS — N95.1 PERIMENOPAUSAL: Primary | ICD-10-CM

## 2023-01-04 DIAGNOSIS — N95.1 PERIMENOPAUSAL VASOMOTOR SYMPTOMS: ICD-10-CM

## 2023-01-04 DIAGNOSIS — R45.86 MOOD SWINGS: ICD-10-CM

## 2023-01-04 PROCEDURE — 99213 OFFICE O/P EST LOW 20 MIN: CPT | Performed by: NURSE PRACTITIONER

## 2023-01-04 PROCEDURE — 1160F RVW MEDS BY RX/DR IN RCRD: CPT | Performed by: NURSE PRACTITIONER

## 2023-01-04 RX ORDER — PAROXETINE 10 MG/1
10 TABLET, FILM COATED ORAL EVERY MORNING
Qty: 60 TABLET | Refills: 0 | Status: SHIPPED | OUTPATIENT
Start: 2023-01-04 | End: 2023-03-02 | Stop reason: SDUPTHER

## 2023-01-04 NOTE — PROGRESS NOTES
Chief Complaint  Fatigue (Would like to get checked for  PERIMENOPAUSE), Depression, hot swings, and mood swings    Subjective        Rosemary Perez presents to [facility] for History of Present Illness  Concerns for fatigue, depression, hot flashes, mood swings.  Patient believes she might be perimenopausal.  Patient reports that she still has cycles, however irregular.  Some lasting greater than 7 days, some lasting less than 7 days, some being heavy, some being light, sometimes more time between cycles.  She has noticed over the last several months that her mood has decreased, has become more irritable towards family and friends, having hot flashes and mood swings.  Feeling more depressed.  She does not report any other significant changes in her life, feels like everything is going fairly well.  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  Family has made notice of her symptoms as well.  She has no thoughts of self harm or harm to others today.       Objective   Vital Signs:   /76 (BP Location: Right arm, Patient Position: Sitting, Cuff Size: Adult)   Pulse 98   Temp 98.2 °F (36.8 °C) (Temporal)   Wt 76.2 kg (168 lb)   SpO2 97%   BMI 25.54 kg/m²     Physical Exam  Vitals and nursing note reviewed.   Constitutional:       Appearance: Normal appearance.   HENT:      Head: Normocephalic and atraumatic.   Pulmonary:      Effort: Pulmonary effort is normal.   Neurological:      Mental Status: She is alert.   Psychiatric:         Mood and Affect: Mood normal.         Thought Content: Thought content normal.         Judgment: Judgment normal.        Result Review :  {The following data was reviewed by OPHELIA Turner on 01/04/23                Diagnoses and all orders for this visit:    1. Perimenopausal (Primary)    2. Mood swings    3. Perimenopausal vasomotor symptoms    Other orders  -     PARoxetine (Paxil) 10 MG tablet; Take 1  tablet by mouth Every Morning.  Dispense: 60 tablet; Refill: 0    We discussed at length the diagnosis of perimenopause and menopause, and the difficulty in interpreting lab work, we deferred this today.  We will start patient on Paxil to help control her symptoms at this time.  We will follow-up in 8 weeks and see how things are going, option for telehealth is available if she needs this.  If we cannot get adequate control of her symptoms, we could discuss referral to gynecology, who she is already established with.  Patient understands and agrees with plan of care.      Follow Up   Return in about 8 weeks (around 3/1/2023) for Recheck, Video visit.  Patient was given instructions and counseling regarding her condition or for health maintenance advice. Please see specific information pulled into the AVS if appropriate.     OPHELIA Turner  1/4/2023  This note was electronically signed.

## 2023-03-02 ENCOUNTER — TELEMEDICINE (OUTPATIENT)
Dept: INTERNAL MEDICINE | Facility: CLINIC | Age: 43
End: 2023-03-02
Payer: MEDICAID

## 2023-03-02 VITALS — HEIGHT: 68 IN | BODY MASS INDEX: 25.16 KG/M2 | WEIGHT: 166 LBS

## 2023-03-02 DIAGNOSIS — N95.1 PERIMENOPAUSAL VASOMOTOR SYMPTOMS: Primary | ICD-10-CM

## 2023-03-02 PROCEDURE — 99213 OFFICE O/P EST LOW 20 MIN: CPT | Performed by: NURSE PRACTITIONER

## 2023-03-02 RX ORDER — PAROXETINE 10 MG/1
10 TABLET, FILM COATED ORAL EVERY MORNING
Qty: 90 TABLET | Refills: 0 | Status: SHIPPED | OUTPATIENT
Start: 2023-03-02

## 2023-03-02 NOTE — PROGRESS NOTES
"Chief Complaint  medication follow up (Pt here to discuss the medication Paroxetine pt states she has bee having headaches with the medication.)    Patient agrees to participate in a telemedicine evaluation performed by OPHELIA Turner.    Patient authorizes the electronic transmission of medical information and/or videoconference session. Patient understands that he/she can still pursue face-to-face consultation if desired. Patient understands that as with any technology, telemedicine does have its limitations. There is no guarantee, therefore, that this telemedicine session will eliminate the need for patient to see a provider in person if the provider feels a physical exams or vital signs are neccessary to care for the patient. Patient understands and would like to proceed with telemedicine visit at this time.    Patient is located at home in a private area, provider located in office    Subjective        Lisa Chris presents to [facility] for History of Present Illness  Follow-up for vasomotor symptoms of menopause.  Patient was seen 8 weeks ago, we did start her on Paxil for vasomotor symptoms of menopause, stating mood swings, irritability, fatigue, and night sweats.  Patient reports that overall her symptoms are doing much better.  Noticing increase in her fatigue, less irritable, less mood swings.  Feeling much more positive.  Her night sweats are still present, she reports that her mother also has those frequently.  She has noticed over the last week that she has had increased headache, primarily waking with headache, most of the time she is able to get it to subside easily with food and caffeine, sometimes requires Tylenol.  She does not notice any other significant symptoms at this time.  Otherwise, no significant concerns or complaints today       Objective   Vital Signs:   Ht 172.7 cm (68\")   Wt 75.3 kg (166 lb)   BMI 25.24 kg/m²     Physical " Exam  Constitutional:       Appearance: Normal appearance.   HENT:      Head: Normocephalic.   Eyes:      Extraocular Movements: Extraocular movements intact.   Skin:     Comments: No visible rashes or lesions   Neurological:      Mental Status: She is alert and oriented to person, place, and time.   Psychiatric:         Mood and Affect: Mood normal.         Behavior: Behavior normal.         Thought Content: Thought content normal.         Judgment: Judgment normal.        Result Review :  {The following data was reviewed by OPHELIA Turner on 03/02/23                Diagnoses and all orders for this visit:    1. Perimenopausal vasomotor symptoms (Primary)    Other orders  -     PARoxetine (Paxil) 10 MG tablet; Take 1 tablet by mouth Every Morning.  Dispense: 90 tablet; Refill: 0    We discussed medication, possible adverse effect of headache, it is only been going on for a week, collectively we decided to continue medication at current dosing since she has had good resolution of most of her symptoms.  If the headache dissipates, and the night sweats continue, we did discuss possibly going up on the medication, which patient understands.  If the headaches are not resolving after a couple more weeks, we discussed switching the medication, and she will reach out if that is needed.  Otherwise, I will send in 90 days, and if still working well after 90 days we will continue to refill.  No absolute need for follow-up at this point, but can follow-up at any point in time if needed.      Follow Up   No follow-ups on file.  Patient was given instructions and counseling regarding her condition or for health maintenance advice. Please see specific information pulled into the AVS if appropriate.     OPHELIA Turner  3/2/2023  This note was electronically signed.

## 2023-05-15 ENCOUNTER — OFFICE VISIT (OUTPATIENT)
Dept: INTERNAL MEDICINE | Facility: CLINIC | Age: 43
End: 2023-05-15
Payer: MEDICAID

## 2023-05-15 VITALS
BODY MASS INDEX: 25.04 KG/M2 | HEIGHT: 68 IN | OXYGEN SATURATION: 99 % | DIASTOLIC BLOOD PRESSURE: 80 MMHG | HEART RATE: 79 BPM | TEMPERATURE: 98.3 F | WEIGHT: 165.2 LBS | SYSTOLIC BLOOD PRESSURE: 122 MMHG

## 2023-05-15 DIAGNOSIS — N63.42 SUBAREOLAR MASS OF LEFT BREAST: Primary | ICD-10-CM

## 2023-05-15 NOTE — PROGRESS NOTES
"Chief Complaint  Breast Problem (Ptt c/o left breast lump states has been there for a year after she stopped breast feeding)    Subjective        Rosemary Perez presents to [facility] for concern of left breast heaviness with possible mass and tenderness.    Patient reports that after stopping breast-feeding in January 2022, she has noticed a heaviness and tenderness to her left breast, located more just underneath of the areola.  She does not associate it with any particular timing throughout the month, denies association with her menstrual cycles.  She states that her  felt the mass recently, and encouraged her to have it checked out.  Patient reports she had had a right breast mass during her pregnancy, they deferred doing any imaging at the time, and patient states it resolved after delivery.  Patient denies any warmth, redness.  Only a single mass.  No nipple discharge.    Objective   Vital Signs:   /80 (BP Location: Left arm, Patient Position: Sitting, Cuff Size: Adult)   Pulse 79   Temp 98.3 °F (36.8 °C) (Temporal)   Ht 172.7 cm (68\")   Wt 74.9 kg (165 lb 3.2 oz)   SpO2 99%   BMI 25.12 kg/m²     Physical Exam  Vitals and nursing note reviewed.   Constitutional:       Appearance: Normal appearance.   HENT:      Head: Normocephalic and atraumatic.   Pulmonary:      Effort: Pulmonary effort is normal.   Genitourinary:     Comments: With Winter Andre MA present in the room, left breast exam completed, with a subareolar mass, with tenderness to palpation observed.  No nipple discharge.  No other masses.  Neurological:      Mental Status: She is alert.        Result Review :  {The following data was reviewed by OPHELIA Turner on 05/15/23                Diagnoses and all orders for this visit:    1. Subareolar mass of left breast (Primary)  -     Mammo Diagnostic Digital Tomosynthesis Bilateral With CAD; Future  -     US Breast Left Limited; Future    We will " go ahead and order diagnostic mammogram with ultrasound.  Follow-up with patient based on results.      Follow Up   No follow-ups on file.  Patient was given instructions and counseling regarding her condition or for health maintenance advice. Please see specific information pulled into the AVS if appropriate.     John Vicente, APRN  5/15/2023  This note was electronically signed.

## 2023-05-31 ENCOUNTER — HOSPITAL ENCOUNTER (OUTPATIENT)
Dept: ULTRASOUND IMAGING | Facility: HOSPITAL | Age: 43
Discharge: HOME | End: 2023-05-31

## 2023-05-31 ENCOUNTER — HOSPITAL ENCOUNTER (OUTPATIENT)
Dept: MAMMOGRAPHY | Facility: HOSPITAL | Age: 43
Discharge: HOME | End: 2023-05-31

## 2023-05-31 DIAGNOSIS — N63.42 SUBAREOLAR MASS OF LEFT BREAST: ICD-10-CM

## 2023-05-31 PROCEDURE — 76642 ULTRASOUND BREAST LIMITED: CPT

## 2023-05-31 PROCEDURE — 77066 DX MAMMO INCL CAD BI: CPT

## 2023-05-31 PROCEDURE — G0279 TOMOSYNTHESIS, MAMMO: HCPCS

## 2023-08-17 ENCOUNTER — OFFICE VISIT (OUTPATIENT)
Dept: OBSTETRICS AND GYNECOLOGY | Facility: CLINIC | Age: 43
End: 2023-08-17
Payer: COMMERCIAL

## 2023-08-17 VITALS
DIASTOLIC BLOOD PRESSURE: 82 MMHG | WEIGHT: 170 LBS | BODY MASS INDEX: 25.76 KG/M2 | HEIGHT: 68 IN | SYSTOLIC BLOOD PRESSURE: 124 MMHG | HEART RATE: 84 BPM

## 2023-08-17 DIAGNOSIS — N92.1 MENORRHAGIA WITH IRREGULAR CYCLE: Primary | ICD-10-CM

## 2023-08-17 LAB
DEPRECATED RDW RBC AUTO: 39.3 FL (ref 37–54)
ERYTHROCYTE [DISTWIDTH] IN BLOOD BY AUTOMATED COUNT: 12.2 % (ref 12.3–15.4)
FSH SERPL-ACNC: 16.3 MIU/ML
HCT VFR BLD AUTO: 40.4 % (ref 34–46.6)
HGB BLD-MCNC: 13.7 G/DL (ref 12–15.9)
MCH RBC QN AUTO: 30.1 PG (ref 26.6–33)
MCHC RBC AUTO-ENTMCNC: 33.9 G/DL (ref 31.5–35.7)
MCV RBC AUTO: 88.8 FL (ref 79–97)
PLATELET # BLD AUTO: 220 10*3/MM3 (ref 140–450)
PMV BLD AUTO: 11.9 FL (ref 6–12)
PROLACTIN SERPL-MCNC: 9.56 NG/ML (ref 4.79–23.3)
RBC # BLD AUTO: 4.55 10*6/MM3 (ref 3.77–5.28)
T4 FREE SERPL-MCNC: 0.94 NG/DL (ref 0.93–1.7)
TSH SERPL DL<=0.05 MIU/L-ACNC: 1.69 UIU/ML (ref 0.27–4.2)
WBC NRBC COR # BLD: 7.01 10*3/MM3 (ref 3.4–10.8)

## 2023-08-17 PROCEDURE — 85027 COMPLETE CBC AUTOMATED: CPT | Performed by: OBSTETRICS & GYNECOLOGY

## 2023-08-17 PROCEDURE — 84439 ASSAY OF FREE THYROXINE: CPT | Performed by: OBSTETRICS & GYNECOLOGY

## 2023-08-17 PROCEDURE — 84443 ASSAY THYROID STIM HORMONE: CPT | Performed by: OBSTETRICS & GYNECOLOGY

## 2023-08-17 PROCEDURE — 84146 ASSAY OF PROLACTIN: CPT | Performed by: OBSTETRICS & GYNECOLOGY

## 2023-08-17 PROCEDURE — 83001 ASSAY OF GONADOTROPIN (FSH): CPT | Performed by: OBSTETRICS & GYNECOLOGY

## 2023-08-17 NOTE — PROGRESS NOTES
"GYN Problem/Follow Up Visit    Chief Complaint   Patient presents with    Heavy Periods           HPI  LisaLucia Perez is a 42 y.o. female, , who presents for heavy menses. States they are progressively getting worse. They are slightly irreg. Will sometimes be a little late and then may have two in one month. They last about 5 days with the majority of those days heavy. Changing pad hourly. Passes golf ball size clots. Has some mild cramping usually on the first day. Mom had ablation and it really helped her so she is interested in that.        Additional OB/GYN History   Patient's last menstrual period was 2023 (approximate).  Current contraception: contraceptive methods: Vasectomy   Desires to: continue contraception  Allergies : Tape and Tegaderm chg dressing [chlorhexidine]     The additional following portions of the patient's history were reviewed and updated as appropriate: allergies, current medications, past family history, past medical history, past social history, past surgical history, and problem list.    Review of Systems    I have reviewed and agree with the HPI, ROS, and historical information as entered above. Lary Marte, APRN    Objective   /82   Pulse 84   Ht 172.7 cm (68\")   Wt 77.1 kg (170 lb)   LMP 2023 (Approximate)   BMI 25.85 kg/mý     Physical Exam  Vitals reviewed.   Neurological:      Mental Status: She is alert and oriented to person, place, and time.          Assessment and Plan    Diagnoses and all orders for this visit:    1. Menorrhagia with irregular cycle (Primary)  -     US Non-ob Transvaginal; Future  -     CBC (No Diff)  -     Follicle Stimulating Hormone  -     Prolactin  -     TSH  -     T4, Free    Will check labs and pelvic u/s. Discussed hormonal and surgical tx options including r/b/se. She will consider options and f/u after her u/s. Pap done one year ago and it was normal. Mammo done .     Counseling:  She understands the " importance of having the above orders performed in a timely fashion.  She is encouraged to review her results online and/or contact or office if she has questions.     Follow Up:  Return for lab and u/s f/u.      OPHELIA Escalera  08/17/2023

## 2023-08-28 NOTE — ADDENDUM NOTE
Addended by: SIOMARA SOLITARIO on: 8/28/2023 08:35 AM     Modules accepted: Level of Service    
Yes

## 2023-08-31 ENCOUNTER — HOSPITAL ENCOUNTER (OUTPATIENT)
Dept: ULTRASOUND IMAGING | Facility: HOSPITAL | Age: 43
Discharge: HOME | End: 2023-08-31
Admitting: OBSTETRICS & GYNECOLOGY
Payer: COMMERCIAL

## 2023-08-31 DIAGNOSIS — N92.1 MENORRHAGIA WITH IRREGULAR CYCLE: ICD-10-CM

## 2023-08-31 PROCEDURE — 76830 TRANSVAGINAL US NON-OB: CPT

## 2023-10-25 RX ORDER — PAROXETINE 10 MG/1
10 TABLET, FILM COATED ORAL EVERY MORNING
Qty: 90 TABLET | Refills: 0 | Status: SHIPPED | OUTPATIENT
Start: 2023-10-25

## 2023-10-25 NOTE — TELEPHONE ENCOUNTER
Caller: Rosemary Perez    Relationship: Self    Best call back number:[phone] (Mobile)     Requested Prescriptions:   Requested Prescriptions     Pending Prescriptions Disp Refills    PARoxetine (Paxil) 10 MG tablet 90 tablet 0     Sig: Take 1 tablet by mouth Every Morning.        Pharmacy where request should be sent:  [facility] [address] - [phone]  - [phone]  [phone]     Last office visit with prescribing clinician: 5/15/2023   Last telemedicine visit with prescribing clinician: Visit date not found   Next office visit with prescribing clinician: Visit date not found     Additional details provided by patient: PATIENT HAS ONE PILL LEFT    Does the patient have less than a 3 day supply:  [x] Yes  [] No    Would you like a call back once the refill request has been completed: [] Yes [] No    If the office needs to give you a call back, can they leave a voicemail: [x] Yes [] No    Kera Doyle Rep   10/25/23 08:27 EDT

## 2023-11-09 ENCOUNTER — OFFICE VISIT (OUTPATIENT)
Dept: OBSTETRICS AND GYNECOLOGY | Facility: CLINIC | Age: 43
End: 2023-11-09
Payer: COMMERCIAL

## 2023-11-09 VITALS
WEIGHT: 167 LBS | HEIGHT: 68 IN | BODY MASS INDEX: 25.31 KG/M2 | DIASTOLIC BLOOD PRESSURE: 80 MMHG | HEART RATE: 90 BPM | SYSTOLIC BLOOD PRESSURE: 129 MMHG

## 2023-11-09 DIAGNOSIS — N92.1 MENORRHAGIA WITH IRREGULAR CYCLE: Primary | ICD-10-CM

## 2023-11-09 PROCEDURE — 99212 OFFICE O/P EST SF 10 MIN: CPT | Performed by: STUDENT IN AN ORGANIZED HEALTH CARE EDUCATION/TRAINING PROGRAM

## 2023-11-09 NOTE — PROGRESS NOTES
"GYN Problem Visit -menorrhagia with irregular cycle    Chief Complaint   Patient presents with    Follow-up     Follow up from  office discuss ablation           HPI  Rosemary Perez is a 43 y.o. female, , who presents for surgical consultation for menorrhagia with irregular cycle.  Evaluated by Lary Marte in August.  Did have labs and ultrasound performed.  Unremarkable pelvic no anemia on CBC ultrasound.  Patient had normal lab findings. Has been on Paxil for VSM symptoms. Does not desire any IUD.   LMP:  10/24/2023  Frequency:  irregular   Duration: lasted 4-5 days  Exacerbating/precipitating factors:   Associated symptoms:  cramping days 1-2   Vaginal irritation or discharge:  none   Non-smoker   Sexual activity:  , partner has vasectomy.    Current birth control:       Additional OB/GYN History   Patient's last menstrual period was 10/24/2023.  Current contraception: contraceptive methods: Vasectomy   Desires to: continue contraception  Allergies : Tape and Tegaderm chg dressing [chlorhexidine]     The additional following portions of the patient's history were reviewed and updated as appropriate: allergies, current medications, past family history, past medical history, past social history, past surgical history, and problem list.    Review of Systems   Constitutional:  Negative for fatigue and fever.   Respiratory:  Negative for cough and shortness of breath.    Cardiovascular:  Negative for chest pain.   Gastrointestinal:  Negative for abdominal distention and abdominal pain.   Genitourinary:  Positive for menstrual problem. Negative for difficulty urinating and dysuria.       I have reviewed and agree with the HPI, ROS, and historical information as entered above. Thomas Saleh MD    Objective   /80   Pulse 90   Ht 172.7 cm (68\")   Wt 75.8 kg (167 lb)   LMP 10/24/2023   BMI 25.39 kg/m²     Physical Exam  Vitals and nursing note reviewed.   Constitutional:       General: " She is not in acute distress.     Appearance: Normal appearance. She is not toxic-appearing.   HENT:      Head: Normocephalic and atraumatic.   Eyes:      Extraocular Movements: Extraocular movements intact.      Conjunctiva/sclera: Conjunctivae normal.   Cardiovascular:      Pulses: Normal pulses.   Pulmonary:      Effort: Pulmonary effort is normal.   Abdominal:      General: There is no distension.      Palpations: Abdomen is soft.      Tenderness: There is no abdominal tenderness.   Genitourinary:     Comments: deferred  Musculoskeletal:      Cervical back: Normal range of motion.   Skin:     General: Skin is warm and dry.   Neurological:      Mental Status: She is alert and oriented to person, place, and time.   Psychiatric:         Mood and Affect: Mood normal.         Behavior: Behavior normal.         Thought Content: Thought content normal.            Assessment and Plan  Rosemary Perez is a 43 y.o.  presenting for consultation in regards to menorrhagia with irregular cycle.  Review of labs within normal limits.  Transvaginal ultrasound with no structural abnormalities observed.  Discussed with patient conservative, medical and surgical interventions.  Specifically discussed with patient IUD placement can assist with menorrhagia.  Discussed with patient risk and benefits and alternatives to surgical interventions including ablative procedure with salpingectomy and hysterectomy.  After discussion patient would like to think about information provided.  She was given pamphlets on IUD, ablation with bilateral salpingectomy.  Patient to call office with decision of medical versus surgical management.    Diagnoses and all orders for this visit:    1. Menorrhagia with irregular cycle (Primary)          She understands the importance of having the above orders performed in a timely fashion.  The risks of not performing them include, but are not limited to, cancer and/or subsequent increase in  morbidity and/or mortality.  She is encouraged to review her results online and/or contact or office if she has questions.     Follow Up:  Return if symptoms worsen or fail to improve.    Thomas Saleh MD  11/09/2023

## 2024-02-01 RX ORDER — PAROXETINE 10 MG/1
10 TABLET, FILM COATED ORAL EVERY MORNING
Qty: 90 TABLET | Refills: 0 | Status: SHIPPED | OUTPATIENT
Start: 2024-02-01

## 2024-02-01 NOTE — TELEPHONE ENCOUNTER
Caller: Chris Lisa    Relationship: Self    Best call back number: [phone]     Requested Prescriptions:   Requested Prescriptions     Pending Prescriptions Disp Refills    PARoxetine (Paxil) 10 MG tablet 90 tablet 0     Sig: Take 1 tablet by mouth Every Morning.        [facility] where [facility] be sent[facility] [address] - [phone]  - [phone] FX     Last office visit with prescribing clinician: 5/15/2023   Last telemedicine visit with prescribing clinician: Visit date not found   Next office visit with prescribing clinician: Visit date not found     Additional details provided by patient: PATIENT IS NEEDING A REFILL.     Does the patient have less than a 3 day supply:  [x] Yes  [] No    Would you like a call back once the refill request has been completed: [x] Yes [] No    If the office needs to give you a call back, can they leave a voicemail: [x] Yes [] No    Kera Holloway Rep   02/01/24 08:08 EST

## 2024-04-05 ENCOUNTER — TELEPHONE (OUTPATIENT)
Dept: INTERNAL MEDICINE | Facility: CLINIC | Age: 44
End: 2024-04-05
Payer: COMMERCIAL

## 2024-04-05 NOTE — TELEPHONE ENCOUNTER
Caller: Rosemary Perez    Relationship: Self    Best call back number: [phone]    What is the medical concern/diagnosis: PRE-MENOPAUSAL SYMPTOMS, HASN'T BEEN FEELING RIGHT, DRINKING     What specialty or service is being requested: MENTAL HEALTH PSYCHIATRIST     What is the provider, practice or medical service name: UNKNOWN, DOCTORS SUGGESTION

## 2024-05-28 RX ORDER — PAROXETINE 10 MG/1
10 TABLET, FILM COATED ORAL EVERY MORNING
Qty: 90 TABLET | Refills: 0 | Status: SHIPPED | OUTPATIENT
Start: 2024-05-28

## 2024-06-10 ENCOUNTER — OFFICE VISIT (OUTPATIENT)
Dept: URGENT CARE | Facility: CLINIC | Age: 44
End: 2024-06-10
Payer: COMMERCIAL

## 2024-06-10 ENCOUNTER — HOSPITAL ENCOUNTER (OUTPATIENT)
Dept: LAB | Facility: MEDICAL CENTER | Age: 44
End: 2024-06-10
Attending: PHYSICIAN ASSISTANT
Payer: COMMERCIAL

## 2024-06-10 ENCOUNTER — HOSPITAL ENCOUNTER (OUTPATIENT)
Dept: RADIOLOGY | Facility: MEDICAL CENTER | Age: 44
End: 2024-06-10
Attending: PHYSICIAN ASSISTANT
Payer: COMMERCIAL

## 2024-06-10 VITALS
OXYGEN SATURATION: 96 % | DIASTOLIC BLOOD PRESSURE: 76 MMHG | SYSTOLIC BLOOD PRESSURE: 134 MMHG | WEIGHT: 163 LBS | BODY MASS INDEX: 24.71 KG/M2 | HEIGHT: 68 IN | TEMPERATURE: 99.2 F | HEART RATE: 105 BPM | RESPIRATION RATE: 16 BRPM

## 2024-06-10 DIAGNOSIS — R10.30 LOWER ABDOMINAL PAIN: ICD-10-CM

## 2024-06-10 DIAGNOSIS — R10.11 RUQ ABDOMINAL PAIN: ICD-10-CM

## 2024-06-10 DIAGNOSIS — N39.0 COMPLICATED UTI (URINARY TRACT INFECTION): ICD-10-CM

## 2024-06-10 DIAGNOSIS — J06.9 VIRAL URI WITH COUGH: ICD-10-CM

## 2024-06-10 LAB
ALBUMIN SERPL BCP-MCNC: 4 G/DL (ref 3.2–4.9)
ALBUMIN/GLOB SERPL: 1.5 G/DL
ALP SERPL-CCNC: 57 U/L (ref 30–99)
ALT SERPL-CCNC: 26 U/L (ref 2–50)
ANION GAP SERPL CALC-SCNC: 13 MMOL/L (ref 7–16)
APPEARANCE UR: NORMAL
AST SERPL-CCNC: 30 U/L (ref 12–45)
BASOPHILS # BLD AUTO: 0.6 % (ref 0–1.8)
BASOPHILS # BLD: 0.02 K/UL (ref 0–0.12)
BILIRUB SERPL-MCNC: 0.4 MG/DL (ref 0.1–1.5)
BILIRUB UR STRIP-MCNC: NEGATIVE MG/DL
BUN SERPL-MCNC: 7 MG/DL (ref 8–22)
CALCIUM ALBUM COR SERPL-MCNC: 8.5 MG/DL (ref 8.5–10.5)
CALCIUM SERPL-MCNC: 8.5 MG/DL (ref 8.5–10.5)
CHLORIDE SERPL-SCNC: 103 MMOL/L (ref 96–112)
CO2 SERPL-SCNC: 22 MMOL/L (ref 20–33)
COLOR UR AUTO: YELLOW
CREAT SERPL-MCNC: 0.91 MG/DL (ref 0.5–1.4)
EOSINOPHIL # BLD AUTO: 0 K/UL (ref 0–0.51)
EOSINOPHIL NFR BLD: 0 % (ref 0–6.9)
ERYTHROCYTE [DISTWIDTH] IN BLOOD BY AUTOMATED COUNT: 44.3 FL (ref 35.9–50)
GFR SERPLBLD CREATININE-BSD FMLA CKD-EPI: 81 ML/MIN/1.73 M 2
GLOBULIN SER CALC-MCNC: 2.7 G/DL (ref 1.9–3.5)
GLUCOSE SERPL-MCNC: 108 MG/DL (ref 65–99)
GLUCOSE UR STRIP.AUTO-MCNC: NEGATIVE MG/DL
HCT VFR BLD AUTO: 38.9 % (ref 37–47)
HGB BLD-MCNC: 12.7 G/DL (ref 12–16)
IMM GRANULOCYTES # BLD AUTO: 0 K/UL (ref 0–0.11)
IMM GRANULOCYTES NFR BLD AUTO: 0 % (ref 0–0.9)
KETONES UR STRIP.AUTO-MCNC: NEGATIVE MG/DL
LEUKOCYTE ESTERASE UR QL STRIP.AUTO: NORMAL
LIPASE SERPL-CCNC: 56 U/L (ref 11–82)
LYMPHOCYTES # BLD AUTO: 0.97 K/UL (ref 1–4.8)
LYMPHOCYTES NFR BLD: 28.2 % (ref 22–41)
MCH RBC QN AUTO: 30.2 PG (ref 27–33)
MCHC RBC AUTO-ENTMCNC: 32.6 G/DL (ref 32.2–35.5)
MCV RBC AUTO: 92.4 FL (ref 81.4–97.8)
MONOCYTES # BLD AUTO: 0.37 K/UL (ref 0–0.85)
MONOCYTES NFR BLD AUTO: 10.8 % (ref 0–13.4)
NEUTROPHILS # BLD AUTO: 2.08 K/UL (ref 1.82–7.42)
NEUTROPHILS NFR BLD: 60.4 % (ref 44–72)
NITRITE UR QL STRIP.AUTO: POSITIVE
NRBC # BLD AUTO: 0 K/UL
NRBC BLD-RTO: 0 /100 WBC (ref 0–0.2)
PH UR STRIP.AUTO: 6 [PH] (ref 5–8)
PLATELET # BLD AUTO: 124 K/UL (ref 164–446)
PMV BLD AUTO: 12.5 FL (ref 9–12.9)
POTASSIUM SERPL-SCNC: 4 MMOL/L (ref 3.6–5.5)
PROT SERPL-MCNC: 6.7 G/DL (ref 6–8.2)
PROT UR QL STRIP: NORMAL MG/DL
RBC # BLD AUTO: 4.21 M/UL (ref 4.2–5.4)
RBC UR QL AUTO: NORMAL
SODIUM SERPL-SCNC: 138 MMOL/L (ref 135–145)
SP GR UR STRIP.AUTO: 1.01
UROBILINOGEN UR STRIP-MCNC: 0.2 MG/DL
WBC # BLD AUTO: 3.4 K/UL (ref 4.8–10.8)

## 2024-06-10 PROCEDURE — 83690 ASSAY OF LIPASE: CPT

## 2024-06-10 PROCEDURE — 3078F DIAST BP <80 MM HG: CPT | Performed by: PHYSICIAN ASSISTANT

## 2024-06-10 PROCEDURE — 76705 ECHO EXAM OF ABDOMEN: CPT

## 2024-06-10 PROCEDURE — 80053 COMPREHEN METABOLIC PANEL: CPT

## 2024-06-10 PROCEDURE — 3075F SYST BP GE 130 - 139MM HG: CPT | Performed by: PHYSICIAN ASSISTANT

## 2024-06-10 PROCEDURE — 85025 COMPLETE CBC W/AUTO DIFF WBC: CPT

## 2024-06-10 PROCEDURE — 36415 COLL VENOUS BLD VENIPUNCTURE: CPT

## 2024-06-10 PROCEDURE — 99204 OFFICE O/P NEW MOD 45 MIN: CPT | Performed by: PHYSICIAN ASSISTANT

## 2024-06-10 PROCEDURE — 81002 URINALYSIS NONAUTO W/O SCOPE: CPT | Performed by: PHYSICIAN ASSISTANT

## 2024-06-10 RX ORDER — DEXTROMETHORPHAN HYDROBROMIDE AND PROMETHAZINE HYDROCHLORIDE 15; 6.25 MG/5ML; MG/5ML
5 SYRUP ORAL EVERY 4 HOURS PRN
Qty: 120 ML | Refills: 0 | Status: SHIPPED | OUTPATIENT
Start: 2024-06-10

## 2024-06-10 RX ORDER — SULFAMETHOXAZOLE AND TRIMETHOPRIM 800; 160 MG/1; MG/1
1 TABLET ORAL 2 TIMES DAILY
Qty: 14 TABLET | Refills: 0 | Status: SHIPPED | OUTPATIENT
Start: 2024-06-10 | End: 2024-06-17

## 2024-06-10 RX ORDER — PAROXETINE 10 MG/1
1 TABLET, FILM COATED ORAL EVERY MORNING
COMMUNITY
Start: 2024-05-28

## 2024-06-10 RX ORDER — BENZONATATE 100 MG/1
100 CAPSULE ORAL 3 TIMES DAILY PRN
Qty: 30 CAPSULE | Refills: 0 | Status: SHIPPED | OUTPATIENT
Start: 2024-06-10

## 2024-06-10 NOTE — PROGRESS NOTES
"Subjective     Hollie Torres is a 43 y.o. female who presents with Nasal Congestion (Headache, cough, x 2 days) and Abdominal Pain (RUQ, x 2-3 days )    HPI:  Hollie Torres is a 43 y.o. female who presents today for evaluation of multiple complaints. She is in town for a [place] tournament. Notes that since being here she has had URI symptoms to include cough and congestion and HA. For the past 2-3 days, however, she has also been noting fairly significant pain in her RUQ. Pain is worse with eating and she has associated nausea. No painful urination. No hx of abdominal surgeries. Has had chills and possible fever.        Review of Systems   Constitutional:  Positive for chills, fever and malaise/fatigue.   HENT:  Positive for congestion.    Respiratory:  Positive for cough.    Cardiovascular:  Negative for chest pain.   Gastrointestinal:  Positive for abdominal pain and nausea.   Genitourinary:  Negative for dysuria.   Musculoskeletal:  Positive for myalgias.   Neurological:  Positive for headaches.         PMH:  has no past medical history on file.  MEDS:   Current Outpatient Medications:     PARoxetine (PAXIL) 10 MG Tab, Take 1 Tablet by mouth every morning., Disp: , Rfl:   ALLERGIES:   Allergies   Allergen Reactions    Chlorhexidine Unspecified    Tape Unspecified     SURGHX: History reviewed. No pertinent surgical history.  SOCHX:  reports that she has never smoked. She does not have any smokeless tobacco history on file. She reports that she does not currently use alcohol. She reports that she does not use drugs.  FH: Family history was reviewed, no pertinent findings to report        Objective     /76   Pulse (!) 105   Temp 37.3 °C (99.2 °F)   Resp 16   Ht 1.727 m (5' 8\")   Wt 73.9 kg (163 lb)   SpO2 96%   BMI 24.78 kg/m²      Physical Exam  Constitutional:       Appearance: She is well-developed.   HENT:      Head: Normocephalic and atraumatic.      Right Ear: External ear normal. "      Left Ear: External ear normal.   Eyes:      Conjunctiva/sclera: Conjunctivae normal.      Pupils: Pupils are equal, round, and reactive to light.   Cardiovascular:      Rate and Rhythm: Normal rate and regular rhythm.      Heart sounds: Normal heart sounds. No murmur heard.  Pulmonary:      Effort: Pulmonary effort is normal.      Breath sounds: Normal breath sounds. No wheezing.   Abdominal:      Tenderness: There is abdominal tenderness in the right upper quadrant, right lower quadrant and suprapubic area. There is no right CVA tenderness, left CVA tenderness, guarding or rebound.   Musculoskeletal:      Cervical back: Normal range of motion.   Lymphadenopathy:      Cervical: No cervical adenopathy.   Skin:     General: Skin is warm and dry.      Capillary Refill: Capillary refill takes less than 2 seconds.   Neurological:      Mental Status: She is alert and oriented to person, place, and time.   Psychiatric:         Behavior: Behavior normal.         Judgment: Judgment normal.       POCT Urinalysis  Lab Results   Component Value Date/Time    POCCOLOR YELLOW 06/10/2024 08:44 AM    POCAPPEAR CLOUDY 06/10/2024 08:44 AM    POCLEUKEST SMALL 06/10/2024 08:44 AM    POCNITRITE POSITIVE 06/10/2024 08:44 AM    POCUROBILIGE 0.2 06/10/2024 08:44 AM    POCPROTEIN TRACE 06/10/2024 08:44 AM    POCURPH 6.0 06/10/2024 08:44 AM    POCBLOOD MODERATE 06/10/2024 08:44 AM    POCSPGRV 1.010 06/10/2024 08:44 AM    POCKETONES NEGATIVE 06/10/2024 08:44 AM    POCBILIRUBIN NEGATIVE 06/10/2024 08:44 AM    POCGLUCUA NEGATIVE 06/10/2024 08:44 AM          US-RUQ  IMPRESSION:     No gallstones or dilatation of the common duct. No free fluid.    Assessment & Plan     1. Viral URI with cough  - benzonatate (TESSALON) 100 MG Cap; Take 1 Capsule by mouth 3 times a day as needed for Cough.  Dispense: 30 Capsule; Refill: 0  - promethazine-dextromethorphan (PROMETHAZINE-DM) 6.25-15 MG/5ML syrup; Take 5 mL by mouth every four hours as needed for  Cough.  Dispense: 120 mL; Refill: 0  - OTC cold/flu medications  -Supportive care also discussed to include the use of saline nasal rinses, steam inhalation, and the use of a cool-mist humidifier in the bedroom at night.  - PO fluids  - Rest  - Tylenol or ibuprofen as needed for fever > 100.4 F    2. RUQ abdominal pain  - US-RUQ; Future  - CBC WITH DIFFERENTIAL; Future  - LIPASE; Future  - Comp Metabolic Panel; Future  Sxs are highly concerning for acute cholecystitis but RUQ US was normal. Labs reassuring as well. Patient will follow up with her PCP when she returns home.    3. Lower abdominal pain  - POCT Urinalysis    4. Complicated UTI (urinary tract infection)  - cefTRIAXone (Rocephin) 500 mg in lidocaine (Xylocaine) 1 % 2 mL for IM use  - sulfamethoxazole-trimethoprim (BACTRIM DS) 800-160 MG tablet; Take 1 Tablet by mouth 2 times a day for 7 days.  Dispense: 14 Tablet; Refill: 0  UA highly consistent with UTI. Unclear if systemic sxs are from this or the viral URI. She was therefore treated with 1 g rocephin IM in clinic and will be started on oral bactrim as well.                Differential Diagnosis, natural history, and supportive care discussed. Return to the Urgent Care or follow up with your PCP if symptoms fail to resolve, or for any new or worsening symptoms. Emergency room precautions discussed. Patient and/or family appears understanding of information.

## 2024-06-12 PROCEDURE — 87086 URINE CULTURE/COLONY COUNT: CPT

## 2024-06-12 NOTE — TELEPHONE ENCOUNTER
Caller: Rosemary Perez    Relationship: Self    Best call back number: [phone]    Requested Prescriptions:   Requested Prescriptions     Pending Prescriptions Disp Refills    PARoxetine (Paxil) 10 MG tablet 90 tablet 0     Sig: Take 1 tablet by mouth Every Morning.        [facility] [address] - [phone]  - [phone] FX     Last office visit with prescribing clinician: 5/15/2023   Last telemedicine visit with prescribing clinician: Visit date not found   Next office visit with prescribing clinician: 7/10/2024     Additional details provided by patient: PATIENT STATED SHE JUST RETURNED HOME FROM A FLIGHT. SHE WENT TO LOOK FOR HER MEDICATION AND NO LONGER HAS IT. SHE BELIEVES SHE MAY HAVE LEFT IT ON THE PLANE AND SHE HAS NONE OF IT.     Does the patient have less than a 3 day supply:  [x] Yes  [] No      Kera Saavedra Rep   06/12/24 14:14 EDT

## 2024-06-14 ASSESSMENT — ENCOUNTER SYMPTOMS
ABDOMINAL PAIN: 1
HEADACHES: 1
FEVER: 1
CHILLS: 1
NAUSEA: 1
COUGH: 1
MYALGIAS: 1

## 2024-06-17 RX ORDER — PAROXETINE 10 MG/1
10 TABLET, FILM COATED ORAL EVERY MORNING
Qty: 90 TABLET | Refills: 0 | Status: SHIPPED | OUTPATIENT
Start: 2024-06-17

## 2024-08-21 NOTE — TELEPHONE ENCOUNTER
Caller: Rosemary Perez    Relationship: Self    Best call back number:     [phone]     Requested Prescriptions:   Requested Prescriptions     Pending Prescriptions Disp Refills    PARoxetine (Paxil) 10 MG tablet 90 tablet 0     Sig: Take 1 tablet by mouth Every Morning.        [facility] [address] - [phone]  - [phone] FX     Last office visit with prescribing clinician: 5/15/2023   Last telemedicine visit with prescribing clinician: Visit date not found   Next office visit with prescribing clinician: Visit date not found     Additional details provided by patient:     Does the patient have less than a 3 day supply:  [] Yes  [] No    Would you like a call back once the refill request has been completed: [] Yes [] No    If the office needs to give you a call back, can they leave a voicemail: [] Yes [] No    Kera Reveles Rep   08/21/24 09:13 EDT

## 2024-08-21 NOTE — TELEPHONE ENCOUNTER
Please advise protocol failed    PHQ2 or PHQ9 on record in past 12 months    Recent or future visit with authorizing provider

## 2024-08-22 RX ORDER — PAROXETINE 10 MG/1
10 TABLET, FILM COATED ORAL EVERY MORNING
Qty: 90 TABLET | Refills: 0 | OUTPATIENT
Start: 2024-08-22